# Patient Record
Sex: MALE | Race: OTHER | Employment: FULL TIME | URBAN - METROPOLITAN AREA
[De-identification: names, ages, dates, MRNs, and addresses within clinical notes are randomized per-mention and may not be internally consistent; named-entity substitution may affect disease eponyms.]

---

## 2019-09-12 ENCOUNTER — HOSPITAL ENCOUNTER (OUTPATIENT)
Age: 44
Discharge: STILL ADMITTED OR ADMINISTRATIVE | End: 2019-09-12
Payer: COMMERCIAL

## 2019-09-12 ENCOUNTER — HOSPITAL ENCOUNTER (EMERGENCY)
Age: 44
Discharge: HOME | End: 2019-09-12
Attending: EMERGENCY MEDICINE
Payer: COMMERCIAL

## 2019-09-12 ENCOUNTER — APPOINTMENT (OUTPATIENT)
Dept: CT IMAGING | Age: 44
End: 2019-09-12
Attending: EMERGENCY MEDICINE
Payer: COMMERCIAL

## 2019-09-12 VITALS
SYSTOLIC BLOOD PRESSURE: 138 MMHG | TEMPERATURE: 97 F | WEIGHT: 178 LBS | RESPIRATION RATE: 16 BRPM | DIASTOLIC BLOOD PRESSURE: 87 MMHG | OXYGEN SATURATION: 97 % | HEART RATE: 87 BPM

## 2019-09-12 DIAGNOSIS — N20.0 KIDNEY STONE: Primary | ICD-10-CM

## 2019-09-12 LAB
ALBUMIN SERPL-MCNC: 4.3 G/DL (ref 3.4–5)
ALBUMIN/GLOB SERPL: 1.1 {RATIO} (ref 1–2)
ALP LIVER SERPL-CCNC: 105 U/L (ref 45–117)
ALT SERPL-CCNC: 36 U/L (ref 16–61)
ANION GAP SERPL CALC-SCNC: 11 MMOL/L (ref 0–18)
AST SERPL-CCNC: 18 U/L (ref 15–37)
BASOPHILS # BLD AUTO: 0.05 X10(3) UL (ref 0–0.2)
BASOPHILS NFR BLD AUTO: 0.4 %
BILIRUB SERPL-MCNC: 0.6 MG/DL (ref 0.1–2)
BILIRUB UR QL STRIP.AUTO: NEGATIVE
BUN BLD-MCNC: 15 MG/DL (ref 7–18)
BUN/CREAT SERPL: 14.2 (ref 10–20)
CALCIUM BLD-MCNC: 9.1 MG/DL (ref 8.5–10.1)
CHLORIDE SERPL-SCNC: 102 MMOL/L (ref 98–112)
CO2 SERPL-SCNC: 24 MMOL/L (ref 21–32)
COLOR UR AUTO: YELLOW
CREAT BLD-MCNC: 1.06 MG/DL (ref 0.7–1.3)
DEPRECATED RDW RBC AUTO: 39 FL (ref 35.1–46.3)
EOSINOPHIL # BLD AUTO: 0.01 X10(3) UL (ref 0–0.7)
EOSINOPHIL NFR BLD AUTO: 0.1 %
ERYTHROCYTE [DISTWIDTH] IN BLOOD BY AUTOMATED COUNT: 12.1 % (ref 11–15)
GLOBULIN PLAS-MCNC: 3.8 G/DL (ref 2.8–4.4)
GLUCOSE BLD-MCNC: 108 MG/DL (ref 70–99)
GLUCOSE UR STRIP.AUTO-MCNC: NEGATIVE MG/DL
GRAN CASTS #/AREA URNS LPF: PRESENT /LPF
HCT VFR BLD AUTO: 44.8 % (ref 39–53)
HGB BLD-MCNC: 15 G/DL (ref 13–17.5)
IMM GRANULOCYTES # BLD AUTO: 0.03 X10(3) UL (ref 0–1)
IMM GRANULOCYTES NFR BLD: 0.2 %
KETONES UR STRIP.AUTO-MCNC: 40 MG/DL
LEUKOCYTE ESTERASE UR QL STRIP.AUTO: NEGATIVE
LYMPHOCYTES # BLD AUTO: 1.01 X10(3) UL (ref 1–4)
LYMPHOCYTES NFR BLD AUTO: 7.8 %
M PROTEIN MFR SERPL ELPH: 8.1 G/DL (ref 6.4–8.2)
MCH RBC QN AUTO: 29.1 PG (ref 26–34)
MCHC RBC AUTO-ENTMCNC: 33.5 G/DL (ref 31–37)
MCV RBC AUTO: 87 FL (ref 80–100)
MONOCYTES # BLD AUTO: 0.35 X10(3) UL (ref 0.1–1)
MONOCYTES NFR BLD AUTO: 2.7 %
NEUTROPHILS # BLD AUTO: 11.53 X10 (3) UL (ref 1.5–7.7)
NEUTROPHILS # BLD AUTO: 11.53 X10(3) UL (ref 1.5–7.7)
NEUTROPHILS NFR BLD AUTO: 88.8 %
NITRITE UR QL STRIP.AUTO: NEGATIVE
OSMOLALITY SERPL CALC.SUM OF ELEC: 285 MOSM/KG (ref 275–295)
PH UR STRIP.AUTO: 6 [PH] (ref 4.5–8)
PLATELET # BLD AUTO: 261 10(3)UL (ref 150–450)
POTASSIUM SERPL-SCNC: 3.9 MMOL/L (ref 3.5–5.1)
RBC # BLD AUTO: 5.15 X10(6)UL (ref 4.3–5.7)
SODIUM SERPL-SCNC: 137 MMOL/L (ref 136–145)
SP GR UR STRIP.AUTO: 1.02 (ref 1–1.03)
UROBILINOGEN UR STRIP.AUTO-MCNC: 0.2 MG/DL
WBC # BLD AUTO: 13 X10(3) UL (ref 4–11)

## 2019-09-12 PROCEDURE — 96374 THER/PROPH/DIAG INJ IV PUSH: CPT

## 2019-09-12 PROCEDURE — 81001 URINALYSIS AUTO W/SCOPE: CPT | Performed by: EMERGENCY MEDICINE

## 2019-09-12 PROCEDURE — 96375 TX/PRO/DX INJ NEW DRUG ADDON: CPT

## 2019-09-12 PROCEDURE — 85025 COMPLETE CBC W/AUTO DIFF WBC: CPT | Performed by: EMERGENCY MEDICINE

## 2019-09-12 PROCEDURE — 74176 CT ABD & PELVIS W/O CONTRAST: CPT | Performed by: EMERGENCY MEDICINE

## 2019-09-12 PROCEDURE — 99284 EMERGENCY DEPT VISIT MOD MDM: CPT

## 2019-09-12 PROCEDURE — 80053 COMPREHEN METABOLIC PANEL: CPT | Performed by: EMERGENCY MEDICINE

## 2019-09-12 RX ORDER — TAMSULOSIN HYDROCHLORIDE 0.4 MG/1
0.4 CAPSULE ORAL DAILY
Qty: 7 CAPSULE | Refills: 0 | Status: SHIPPED | OUTPATIENT
Start: 2019-09-12 | End: 2019-09-19

## 2019-09-12 RX ORDER — HYDROCODONE BITARTRATE AND ACETAMINOPHEN 5; 325 MG/1; MG/1
1-2 TABLET ORAL EVERY 6 HOURS PRN
Qty: 10 TABLET | Refills: 0 | Status: SHIPPED | OUTPATIENT
Start: 2019-09-12 | End: 2019-09-19

## 2019-09-12 RX ORDER — ONDANSETRON 4 MG/1
4 TABLET, ORALLY DISINTEGRATING ORAL EVERY 6 HOURS PRN
Qty: 15 TABLET | Refills: 0 | Status: SHIPPED | OUTPATIENT
Start: 2019-09-12 | End: 2019-09-19

## 2019-09-12 RX ORDER — ONDANSETRON 2 MG/ML
4 INJECTION INTRAMUSCULAR; INTRAVENOUS ONCE
Status: COMPLETED | OUTPATIENT
Start: 2019-09-12 | End: 2019-09-12

## 2019-09-12 RX ORDER — HYDROCODONE BITARTRATE AND ACETAMINOPHEN 5; 300 MG/1; MG/1
TABLET ORAL
COMMUNITY

## 2019-09-12 RX ORDER — HYDROMORPHONE HYDROCHLORIDE 1 MG/ML
INJECTION, SOLUTION INTRAMUSCULAR; INTRAVENOUS; SUBCUTANEOUS
Status: DISCONTINUED
Start: 2019-09-12 | End: 2019-09-12 | Stop reason: WASHOUT

## 2019-09-12 RX ORDER — KETOROLAC TROMETHAMINE 30 MG/ML
30 INJECTION, SOLUTION INTRAMUSCULAR; INTRAVENOUS ONCE
Status: COMPLETED | OUTPATIENT
Start: 2019-09-12 | End: 2019-09-12

## 2019-09-12 RX ORDER — HYDROMORPHONE HYDROCHLORIDE 1 MG/ML
1 INJECTION, SOLUTION INTRAMUSCULAR; INTRAVENOUS; SUBCUTANEOUS EVERY 30 MIN PRN
Status: DISCONTINUED | OUTPATIENT
Start: 2019-09-12 | End: 2019-09-12

## 2019-09-12 RX ORDER — KETOROLAC TROMETHAMINE 10 MG/1
10 TABLET, FILM COATED ORAL EVERY 6 HOURS PRN
Qty: 30 TABLET | Refills: 0 | Status: SHIPPED | OUTPATIENT
Start: 2019-09-12 | End: 2019-09-19

## 2019-09-12 NOTE — ED PROVIDER NOTES
Patient Seen in: [address] [facility] In [facility]    History   Patient presents with:  Abdomen/Flank Pain (GI/)    Stated Complaint: right flank pain    HPI    27-year-old male presents for evaluation of right flank pain.   Patient developed ri throughout.         ED Course     Labs Reviewed   URINALYSIS WITH CULTURE REFLEX - Abnormal; Notable for the following components:       Result Value    Clarity Urine Slightly Cloudy (*)     Ketones Urine 40  (*)     Blood Urine Moderate (*)     Protein Mickiel Sharmila complain of right lower quadrant pain for (1)day, hx of left side stone (1)month ago. FINDINGS:  KIDNEYS:  Normal anatomic positions. 0.7 cm right UVJ calculus with moderate hydronephrosis.   Numerous additional nonobstructing right renal calculi measur 1 day          Medications Prescribed:  Current Discharge Medication List    START taking these medications    tamsulosin HCl (FLOMAX) 0.4 MG Oral Cap  Take 1 capsule (0.4 mg total) by mouth daily for 7 days.   Qty: 7 capsule Refills: 0    Ketorolac Trometh

## 2019-09-18 PROCEDURE — 82365 CALCULUS SPECTROSCOPY: CPT | Performed by: UROLOGY

## 2019-10-08 PROCEDURE — 87086 URINE CULTURE/COLONY COUNT: CPT | Performed by: UROLOGY

## 2019-10-08 PROCEDURE — 81015 MICROSCOPIC EXAM OF URINE: CPT | Performed by: UROLOGY
